# Patient Record
Sex: FEMALE | Employment: FULL TIME | URBAN - METROPOLITAN AREA
[De-identification: names, ages, dates, MRNs, and addresses within clinical notes are randomized per-mention and may not be internally consistent; named-entity substitution may affect disease eponyms.]

---

## 2019-07-06 ENCOUNTER — OFFICE VISIT (OUTPATIENT)
Dept: URGENT CARE | Facility: URGENT CARE | Age: 29
End: 2019-07-06
Payer: COMMERCIAL

## 2019-07-06 VITALS
HEIGHT: 61 IN | HEART RATE: 68 BPM | DIASTOLIC BLOOD PRESSURE: 88 MMHG | SYSTOLIC BLOOD PRESSURE: 122 MMHG | TEMPERATURE: 98.2 F | BODY MASS INDEX: 34.55 KG/M2 | RESPIRATION RATE: 16 BRPM | WEIGHT: 183 LBS

## 2019-07-06 DIAGNOSIS — H61.21 IMPACTED CERUMEN OF RIGHT EAR: Primary | ICD-10-CM

## 2019-07-06 PROCEDURE — 69209 REMOVE IMPACTED EAR WAX UNI: CPT | Mod: RT | Performed by: FAMILY MEDICINE

## 2019-07-06 ASSESSMENT — MIFFLIN-ST. JEOR: SCORE: 1494.52

## 2019-07-06 NOTE — PROGRESS NOTES
"SUBJECTIVE:Francy Freedman is a 28 year old female who presents with right ear waxfor  day(s).   Severity: moderate   Timing:still present  Additional symptoms include none.    History of recurrent otitis: not applicable    No past medical history on file.  No Known Allergies  Social History     Tobacco Use     Smoking status: Never Smoker     Smokeless tobacco: Never Used   Substance Use Topics     Alcohol use: Not on file       ROS: CONSTITUTIONAL:NEGATIVE for fever, chills, change in weight    OBJECTIVE:  /88 (Cuff Size: Adult Regular)   Pulse 68   Temp 98.2  F (36.8  C) (Oral)   Resp 16   Ht 1.537 m (5' 0.5\")   Wt 83 kg (183 lb)   BMI 35.15 kg/m     The right TM is not visualized secondary to cerumen     The right auditory canal is normal and without drainage, edema or erythema  The left TM is normal: no effusions, no erythema, and normal landmarks  The left auditory canal is normal and without drainage, edema or erythema  Oropharynx exam is normal: no lesions, erythema, adenopathy or exudate.GENERAL: no acute distress  EYES: EOMI,  PERRL, conjunctiva clear  SKIN: no suspicious lesions or rashes       ICD-10-CM    1. Impacted cerumen of right ear H61.21 HC REMOVAL IMPACTED CERUMEN IRRIGATION/LVG UNILAT     Nursing Communication 1     TM clear after irrigation  F/U PCP/IM/FP/UC if worse, not any better    "